# Patient Record
Sex: FEMALE | Race: WHITE | NOT HISPANIC OR LATINO
[De-identification: names, ages, dates, MRNs, and addresses within clinical notes are randomized per-mention and may not be internally consistent; named-entity substitution may affect disease eponyms.]

---

## 2018-01-04 ENCOUNTER — APPOINTMENT (OUTPATIENT)
Dept: FAMILY MEDICINE | Facility: CLINIC | Age: 38
End: 2018-01-04

## 2018-01-04 PROBLEM — Z00.00 ENCOUNTER FOR PREVENTIVE HEALTH EXAMINATION: Status: ACTIVE | Noted: 2018-01-04

## 2018-02-28 ENCOUNTER — APPOINTMENT (OUTPATIENT)
Dept: FAMILY MEDICINE | Facility: CLINIC | Age: 38
End: 2018-02-28
Payer: MEDICAID

## 2018-02-28 VITALS
DIASTOLIC BLOOD PRESSURE: 68 MMHG | HEIGHT: 63.5 IN | BODY MASS INDEX: 19.42 KG/M2 | WEIGHT: 111 LBS | RESPIRATION RATE: 14 BRPM | OXYGEN SATURATION: 99 % | SYSTOLIC BLOOD PRESSURE: 108 MMHG | HEART RATE: 84 BPM | TEMPERATURE: 99.5 F

## 2018-02-28 DIAGNOSIS — Z86.010 PERSONAL HISTORY OF COLONIC POLYPS: ICD-10-CM

## 2018-02-28 DIAGNOSIS — Z80.42 FAMILY HISTORY OF MALIGNANT NEOPLASM OF PROSTATE: ICD-10-CM

## 2018-02-28 DIAGNOSIS — Z82.49 FAMILY HISTORY OF ISCHEMIC HEART DISEASE AND OTHER DISEASES OF THE CIRCULATORY SYSTEM: ICD-10-CM

## 2018-02-28 PROCEDURE — 99213 OFFICE O/P EST LOW 20 MIN: CPT

## 2018-02-28 RX ORDER — VENLAFAXINE HYDROCHLORIDE 75 MG/1
75 CAPSULE, EXTENDED RELEASE ORAL
Qty: 30 | Refills: 0 | Status: ACTIVE | COMMUNITY
Start: 2017-11-21

## 2018-05-23 ENCOUNTER — APPOINTMENT (OUTPATIENT)
Dept: FAMILY MEDICINE | Facility: CLINIC | Age: 38
End: 2018-05-23
Payer: MEDICAID

## 2018-05-23 VITALS
HEART RATE: 80 BPM | OXYGEN SATURATION: 100 % | RESPIRATION RATE: 14 BRPM | HEIGHT: 63.5 IN | DIASTOLIC BLOOD PRESSURE: 68 MMHG | WEIGHT: 112 LBS | SYSTOLIC BLOOD PRESSURE: 92 MMHG | BODY MASS INDEX: 19.6 KG/M2

## 2018-05-23 DIAGNOSIS — B27.90 INFECTIOUS MONONUCLEOSIS, UNSPECIFIED W/OUT COMPLICATION: ICD-10-CM

## 2018-05-23 DIAGNOSIS — Z86.39 PERSONAL HISTORY OF OTHER ENDOCRINE, NUTRITIONAL AND METABOLIC DISEASE: ICD-10-CM

## 2018-05-23 LAB — CYTOLOGY CVX/VAG DOC THIN PREP: NORMAL

## 2018-05-23 PROCEDURE — 99214 OFFICE O/P EST MOD 30 MIN: CPT

## 2018-05-23 RX ORDER — AZITHROMYCIN 250 MG/1
250 TABLET, FILM COATED ORAL
Qty: 6 | Refills: 0 | Status: DISCONTINUED | COMMUNITY
Start: 2018-02-28 | End: 2018-05-23

## 2018-05-23 RX ORDER — ZALEPLON 5 MG/1
5 CAPSULE ORAL
Qty: 30 | Refills: 1 | Status: DISCONTINUED | COMMUNITY
Start: 2018-02-28 | End: 2018-05-23

## 2018-05-23 NOTE — PHYSICAL EXAM
[No Acute Distress] : no acute distress [Well Nourished] : well nourished [Well Developed] : well developed [Well-Appearing] : well-appearing [Normal Voice/Communication] : normal voice/communication [Normal Sclera/Conjunctiva] : normal sclera/conjunctiva [No Respiratory Distress] : no respiratory distress  [Clear to Auscultation] : lungs were clear to auscultation bilaterally [No Accessory Muscle Use] : no accessory muscle use [Normal Rate] : normal rate  [Regular Rhythm] : with a regular rhythm [Normal S1, S2] : normal S1 and S2 [No Murmur] : no murmur heard [Soft] : abdomen soft [Non Tender] : non-tender [Non-distended] : non-distended [No Masses] : no abdominal mass palpated [No HSM] : no HSM [Normal Bowel Sounds] : normal bowel sounds [No Hernias] : no hernias [Speech Grossly Normal] : speech grossly normal [Memory Grossly Normal] : memory grossly normal [Normal Affect] : the affect was normal [Normal Mood] : the mood was normal [Normal Insight/Judgement] : insight and judgment were intact

## 2018-05-23 NOTE — HISTORY OF PRESENT ILLNESS
[FreeTextEntry8] : pt presents for feeling tired, being emotional, has tested positive for ebv so would like to have blood work done. She has been feeling fatigued for several years.  She has trouble sleeping and gets her best sleep in the morning hours.  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  She tried zaleplon for sleep.  She had no improvement in her sleep but it made her tired in the morning.  She takes the clonazepam daily, 1/2 tab in the morning and a full tablet at night.  She gets a sore throat at times.  On 5/17 she had sudden sharp pain in her left upper abdomen.  She was seen at [facility] and was started on protonix but stopped it.  She gets a lot of sinus headaches.  She would like to pursue seeing a sleep specialist.  She just saw an endocrinologist and will have labs done.

## 2018-05-23 NOTE — REVIEW OF SYSTEMS
[Fatigue] : fatigue [Nasal Discharge] : nasal discharge [Abdominal Pain] : abdominal pain [Muscle Weakness] : muscle weakness [Headache] : headache [Insomnia] : insomnia [Anxiety] : anxiety [Depression] : depression [Negative] : Heme/Lymph

## 2018-05-23 NOTE — ASSESSMENT
[FreeTextEntry1] : labs and abdominal ultrasound were ordered, she will schedule a consultation with a sleep medicine specialist, she was advised to try an OTC sedating antihistamine at HS to help with sleep and sinus congestion, I will call her with results when available, emotional support was given

## 2018-05-30 ENCOUNTER — APPOINTMENT (OUTPATIENT)
Dept: FAMILY MEDICINE | Facility: CLINIC | Age: 38
End: 2018-05-30

## 2018-06-08 ENCOUNTER — APPOINTMENT (OUTPATIENT)
Dept: CT IMAGING | Facility: CLINIC | Age: 38
End: 2018-06-08

## 2018-06-08 ENCOUNTER — APPOINTMENT (OUTPATIENT)
Dept: ULTRASOUND IMAGING | Facility: CLINIC | Age: 38
End: 2018-06-08

## 2018-06-12 ENCOUNTER — RESULT REVIEW (OUTPATIENT)
Age: 38
End: 2018-06-12

## 2018-10-04 DIAGNOSIS — H91.90 UNSPECIFIED HEARING LOSS, UNSPECIFIED EAR: ICD-10-CM

## 2019-02-08 ENCOUNTER — APPOINTMENT (OUTPATIENT)
Dept: FAMILY MEDICINE | Facility: CLINIC | Age: 39
End: 2019-02-08
Payer: MEDICAID

## 2019-02-08 VITALS
RESPIRATION RATE: 14 BRPM | TEMPERATURE: 98.2 F | WEIGHT: 115 LBS | SYSTOLIC BLOOD PRESSURE: 98 MMHG | HEART RATE: 95 BPM | DIASTOLIC BLOOD PRESSURE: 60 MMHG | HEIGHT: 63.5 IN | BODY MASS INDEX: 20.12 KG/M2 | OXYGEN SATURATION: 96 %

## 2019-02-08 DIAGNOSIS — J06.9 ACUTE UPPER RESPIRATORY INFECTION, UNSPECIFIED: ICD-10-CM

## 2019-02-08 PROCEDURE — 99214 OFFICE O/P EST MOD 30 MIN: CPT

## 2019-02-08 RX ORDER — NAPROXEN 500 MG/1
500 TABLET ORAL
Qty: 60 | Refills: 0 | Status: DISCONTINUED | COMMUNITY
Start: 2017-09-01 | End: 2019-02-08

## 2019-02-08 RX ORDER — PANTOPRAZOLE 40 MG/1
40 TABLET, DELAYED RELEASE ORAL
Qty: 30 | Refills: 0 | Status: DISCONTINUED | COMMUNITY
Start: 2018-05-17 | End: 2019-02-08

## 2019-02-08 NOTE — HISTORY OF PRESENT ILLNESS
[FreeTextEntry8] : Pt c/o +sore throat +fever +earache X 1 week.  Her symptoms started on monday.  She went to urgent care and was diagnosed with an upper respiratory infection.  She refused a strep test.  She had a sore throat initially and then her ears started hurting and popping.  She has ongoing discharge only from the right nostril and had discomfort in her sinuses.  She feels weak and feverish.  She is sneezing and has postnasal drip and a dry cough.  She took a multisymptoms medication a few days ago.  She has been losing a lot of hair since september.

## 2019-02-08 NOTE — ASSESSMENT
[FreeTextEntry1] : she was advised to take augmentin and use fluticasone nasal spray as directed, labs were ordered to further evaluate her symptoms and assess her health, she was given emotional support and will follow up if symptoms persist or worsen and will receive a phone call with lab results, ENT consultation was advised due to the persistant nasal discharge

## 2019-02-08 NOTE — REVIEW OF SYSTEMS
[Chills] : chills [Fatigue] : fatigue [Earache] : earache [Nasal Discharge] : nasal discharge [Sore Throat] : sore throat [Postnasal Drip] : postnasal drip [Cough] : cough [Muscle Weakness] : muscle weakness [Hair Changes] : hair changes [Headache] : headache [Dizziness] : dizziness [Insomnia] : insomnia [Negative] : Heme/Lymph [Fever] : no fever [Discharge] : no discharge [Shortness Of Breath] : no shortness of breath [Wheezing] : no wheezing

## 2019-02-08 NOTE — PHYSICAL EXAM
[No Acute Distress] : no acute distress [Well Nourished] : well nourished [Well Developed] : well developed [Well-Appearing] : well-appearing [Normal Voice/Communication] : normal voice/communication [Normal Sclera/Conjunctiva] : normal sclera/conjunctiva [Normal Oropharynx] : the oropharynx was normal [Supple] : supple [No Lymphadenopathy] : no lymphadenopathy [No Respiratory Distress] : no respiratory distress  [Clear to Auscultation] : lungs were clear to auscultation bilaterally [No Accessory Muscle Use] : no accessory muscle use [Normal Rate] : normal rate  [Regular Rhythm] : with a regular rhythm [Normal S1, S2] : normal S1 and S2 [Speech Grossly Normal] : speech grossly normal [Memory Grossly Normal] : memory grossly normal [Normal Insight/Judgement] : insight and judgment were intact [de-identified] : TM's dull bilaterally, erythema and edema in bilateral nasal passages [de-identified] : she appears anxious

## 2019-02-25 ENCOUNTER — APPOINTMENT (OUTPATIENT)
Dept: FAMILY MEDICINE | Facility: CLINIC | Age: 39
End: 2019-02-25

## 2019-02-26 ENCOUNTER — APPOINTMENT (OUTPATIENT)
Dept: FAMILY MEDICINE | Facility: CLINIC | Age: 39
End: 2019-02-26

## 2019-02-27 ENCOUNTER — APPOINTMENT (OUTPATIENT)
Dept: FAMILY MEDICINE | Facility: CLINIC | Age: 39
End: 2019-02-27
Payer: MEDICAID

## 2019-02-27 VITALS
WEIGHT: 115 LBS | TEMPERATURE: 98.8 F | DIASTOLIC BLOOD PRESSURE: 80 MMHG | OXYGEN SATURATION: 98 % | BODY MASS INDEX: 20.12 KG/M2 | HEART RATE: 95 BPM | RESPIRATION RATE: 14 BRPM | HEIGHT: 63.5 IN | SYSTOLIC BLOOD PRESSURE: 104 MMHG

## 2019-02-27 PROCEDURE — G0444 DEPRESSION SCREEN ANNUAL: CPT

## 2019-02-27 PROCEDURE — 99214 OFFICE O/P EST MOD 30 MIN: CPT | Mod: 25

## 2019-02-27 NOTE — PHYSICAL EXAM
[No Acute Distress] : no acute distress [Well Nourished] : well nourished [Well Developed] : well developed [Well-Appearing] : well-appearing [Normal Sclera/Conjunctiva] : normal sclera/conjunctiva [Normal Outer Ear/Nose] : the outer ears and nose were normal in appearance [Normal TMs] : both tympanic membranes were normal [Supple] : supple [No Lymphadenopathy] : no lymphadenopathy [No Respiratory Distress] : no respiratory distress  [Clear to Auscultation] : lungs were clear to auscultation bilaterally [No Accessory Muscle Use] : no accessory muscle use [Normal Rate] : normal rate  [Regular Rhythm] : with a regular rhythm [Normal S1, S2] : normal S1 and S2 [No Murmur] : no murmur heard [Normal Gait] : normal gait [Coordination Grossly Intact] : coordination grossly intact [No Focal Deficits] : no focal deficits [Speech Grossly Normal] : speech grossly normal [Memory Grossly Normal] : memory grossly normal [Alert and Oriented x3] : oriented to person, place, and time [Normal Insight/Judgement] : insight and judgment were intact [de-identified] : bilateral nasal turbinates and posterior oropharynx erythematous, moderate clear post nasal drip [de-identified] : anxious

## 2019-02-27 NOTE — ASSESSMENT
[FreeTextEntry1] : Rest, fluids, flonase, vitamin C \par     - pt advised to take antibiotics as prescribed and to finish complete course\par     - patient instructed to RTO if symptoms worsen or persist, if fevers develop, does not get better in 7 days.\par \par Anxiety/Depression:\par    - PHQ 9 - 18\par    - referral given to pt for a therapist\par    - advised her to follow up with psychiatry\par    - yoga, exercise, mindfulness, meditation, deep breathing encouraged

## 2019-02-27 NOTE — HISTORY OF PRESENT ILLNESS
[FreeTextEntry8] : Pt c/o +losing voice +phlegmy feeling +sore throat +ear ache X 2-3 weeks. Did not take antibiotics as prescribed for her sinusitis. \par Used tylenol pm cold and sinus with some relief. Denies fevers, shortness of breath, wheezing, n/v/d. Patient also reports significant financial, work and relationship stressors. Sees psychiatrist. Reports difficulty sleeping and getting out of bed on time for work. She is stressed about aging and her hair thinning. She is requesting a referral to speak with a therapist. Pt denies being suicidal or having any plans.

## 2019-02-27 NOTE — REVIEW OF SYSTEMS
[Nasal Discharge] : nasal discharge [Sore Throat] : sore throat [Postnasal Drip] : postnasal drip [Cough] : cough [Anxiety] : anxiety [Depression] : depression [Negative] : Heme/Lymph

## 2019-03-08 ENCOUNTER — RX RENEWAL (OUTPATIENT)
Age: 39
End: 2019-03-08

## 2019-03-11 ENCOUNTER — RX RENEWAL (OUTPATIENT)
Age: 39
End: 2019-03-11

## 2020-03-20 ENCOUNTER — TRANSCRIPTION ENCOUNTER (OUTPATIENT)
Age: 40
End: 2020-03-20

## 2020-12-03 ENCOUNTER — TRANSCRIPTION ENCOUNTER (OUTPATIENT)
Age: 40
End: 2020-12-03

## 2020-12-09 ENCOUNTER — TRANSCRIPTION ENCOUNTER (OUTPATIENT)
Age: 40
End: 2020-12-09

## 2021-06-02 ENCOUNTER — APPOINTMENT (OUTPATIENT)
Dept: FAMILY MEDICINE | Facility: CLINIC | Age: 41
End: 2021-06-02
Payer: MEDICAID

## 2021-06-02 VITALS
SYSTOLIC BLOOD PRESSURE: 118 MMHG | TEMPERATURE: 97.7 F | RESPIRATION RATE: 14 BRPM | BODY MASS INDEX: 21 KG/M2 | DIASTOLIC BLOOD PRESSURE: 72 MMHG | OXYGEN SATURATION: 99 % | WEIGHT: 120 LBS | HEART RATE: 88 BPM | HEIGHT: 63.5 IN

## 2021-06-02 DIAGNOSIS — U07.1 COVID-19: ICD-10-CM

## 2021-06-02 DIAGNOSIS — E78.2 MIXED HYPERLIPIDEMIA: ICD-10-CM

## 2021-06-02 DIAGNOSIS — Z87.898 PERSONAL HISTORY OF OTHER SPECIFIED CONDITIONS: ICD-10-CM

## 2021-06-02 DIAGNOSIS — J01.00 ACUTE MAXILLARY SINUSITIS, UNSPECIFIED: ICD-10-CM

## 2021-06-02 DIAGNOSIS — R35.0 FREQUENCY OF MICTURITION: ICD-10-CM

## 2021-06-02 LAB — CYTOLOGY CVX/VAG DOC THIN PREP: NORMAL

## 2021-06-02 PROCEDURE — 99072 ADDL SUPL MATRL&STAF TM PHE: CPT

## 2021-06-02 PROCEDURE — 36415 COLL VENOUS BLD VENIPUNCTURE: CPT

## 2021-06-02 PROCEDURE — 99214 OFFICE O/P EST MOD 30 MIN: CPT | Mod: 25

## 2021-06-02 RX ORDER — TRAZODONE HYDROCHLORIDE 50 MG/1
50 TABLET ORAL
Qty: 30 | Refills: 0 | Status: DISCONTINUED | COMMUNITY
Start: 2021-01-25

## 2021-06-02 RX ORDER — SULFAMETHOXAZOLE AND TRIMETHOPRIM 800; 160 MG/1; MG/1
800-160 TABLET ORAL
Qty: 10 | Refills: 0 | Status: DISCONTINUED | COMMUNITY
Start: 2021-01-05

## 2021-06-02 RX ORDER — VENLAFAXINE HYDROCHLORIDE 37.5 MG/1
37.5 CAPSULE, EXTENDED RELEASE ORAL
Qty: 15 | Refills: 0 | Status: DISCONTINUED | COMMUNITY
Start: 2021-02-09

## 2021-06-02 RX ORDER — GABAPENTIN 100 MG/1
100 CAPSULE ORAL
Qty: 15 | Refills: 0 | Status: DISCONTINUED | COMMUNITY
Start: 2021-02-23

## 2021-06-02 RX ORDER — CLONAZEPAM 2 MG/1
2 TABLET ORAL
Qty: 60 | Refills: 0 | Status: DISCONTINUED | COMMUNITY
Start: 2018-01-25 | End: 2021-06-02

## 2021-06-02 RX ORDER — VENLAFAXINE HYDROCHLORIDE 150 MG/1
150 CAPSULE, EXTENDED RELEASE ORAL
Qty: 22 | Refills: 0 | Status: DISCONTINUED | COMMUNITY
Start: 2021-01-05

## 2021-06-02 RX ORDER — BUSPIRONE HYDROCHLORIDE 15 MG/1
15 TABLET ORAL
Qty: 28 | Refills: 0 | Status: DISCONTINUED | COMMUNITY
Start: 2021-01-25

## 2021-06-02 RX ORDER — CHROMIUM 200 MCG
25 MCG TABLET ORAL
Qty: 30 | Refills: 0 | Status: DISCONTINUED | COMMUNITY
Start: 2021-01-21

## 2021-06-02 RX ORDER — AZELASTINE HYDROCHLORIDE 137 UG/1
0.1 SPRAY, METERED NASAL
Qty: 30 | Refills: 0 | Status: DISCONTINUED | COMMUNITY
Start: 2021-04-09

## 2021-06-02 RX ORDER — AMOXICILLIN AND CLAVULANATE POTASSIUM 875; 125 MG/1; MG/1
875-125 TABLET, COATED ORAL
Qty: 20 | Refills: 0 | Status: DISCONTINUED | COMMUNITY
Start: 2019-02-08 | End: 2021-06-02

## 2021-06-02 RX ORDER — LORATADINE 10 MG/1
10 TABLET ORAL
Qty: 30 | Refills: 0 | Status: DISCONTINUED | COMMUNITY
Start: 2021-04-07

## 2021-06-02 NOTE — REVIEW OF SYSTEMS
[Fatigue] : fatigue [Nocturia] : nocturia [Frequency] : frequency [Anxiety] : anxiety [Depression] : depression [Negative] : Heme/Lymph [de-identified] : numbness

## 2021-06-02 NOTE — HISTORY OF PRESENT ILLNESS
[FreeTextEntry1] : pt presents for follow up and blood work to be done [de-identified] : She has been under increased stress as her mother is ill with recurrent multiple myeloma.  She is seeing a psychiatrist now.  She had lab testing a few years ago and was told she had high cholesterol and she is concerned.  For the past few weeks she is waking up more often during the night to urinate.  She has a large fibroid in her uterus.  Also sometimes when lying in bed the left side of her body will go numb.

## 2021-06-02 NOTE — PHYSICAL EXAM
[No Acute Distress] : no acute distress [Well Nourished] : well nourished [Well Developed] : well developed [Well-Appearing] : well-appearing [Normal Voice/Communication] : normal voice/communication [Supple] : supple [No Respiratory Distress] : no respiratory distress  [No Accessory Muscle Use] : no accessory muscle use [Clear to Auscultation] : lungs were clear to auscultation bilaterally [Normal Rate] : normal rate  [Regular Rhythm] : with a regular rhythm [Normal S1, S2] : normal S1 and S2 [No Murmur] : no murmur heard [No Carotid Bruits] : no carotid bruits [Soft] : abdomen soft [Non Tender] : non-tender [Non-distended] : non-distended [Coordination Grossly Intact] : coordination grossly intact [Normal Mood] : the mood was normal

## 2021-06-02 NOTE — PLAN
[FreeTextEntry1] : she was given emotional support, labs will be drawn in the office today including CBC, CMP, ferritin, B12, lipid panel, a1C and TSH to assess her symptoms, neurology consultation advised, she will be called with results

## 2021-06-03 LAB
ALBUMIN SERPL ELPH-MCNC: 4.5 G/DL
ALP BLD-CCNC: 57 U/L
ALT SERPL-CCNC: 18 U/L
ANION GAP SERPL CALC-SCNC: 10 MMOL/L
AST SERPL-CCNC: 17 U/L
BASOPHILS # BLD AUTO: 0.05 K/UL
BASOPHILS NFR BLD AUTO: 1.3 %
BILIRUB SERPL-MCNC: 0.3 MG/DL
BUN SERPL-MCNC: 13 MG/DL
CALCIUM SERPL-MCNC: 9.7 MG/DL
CHLORIDE SERPL-SCNC: 102 MMOL/L
CHOLEST SERPL-MCNC: 225 MG/DL
CO2 SERPL-SCNC: 28 MMOL/L
CREAT SERPL-MCNC: 0.8 MG/DL
EOSINOPHIL # BLD AUTO: 0.05 K/UL
EOSINOPHIL NFR BLD AUTO: 1.3 %
ESTIMATED AVERAGE GLUCOSE: 105 MG/DL
FERRITIN SERPL-MCNC: 107 NG/ML
FOLATE SERPL-MCNC: >20 NG/ML
GLUCOSE SERPL-MCNC: 69 MG/DL
HBA1C MFR BLD HPLC: 5.3 %
HCT VFR BLD CALC: 41.5 %
HDLC SERPL-MCNC: 90 MG/DL
HGB BLD-MCNC: 13.4 G/DL
IMM GRANULOCYTES NFR BLD AUTO: 0.3 %
LDLC SERPL CALC-MCNC: 125 MG/DL
LYMPHOCYTES # BLD AUTO: 1.13 K/UL
LYMPHOCYTES NFR BLD AUTO: 28.3 %
MAN DIFF?: NORMAL
MCHC RBC-ENTMCNC: 30.9 PG
MCHC RBC-ENTMCNC: 32.3 GM/DL
MCV RBC AUTO: 95.8 FL
MONOCYTES # BLD AUTO: 0.39 K/UL
MONOCYTES NFR BLD AUTO: 9.8 %
NEUTROPHILS # BLD AUTO: 2.36 K/UL
NEUTROPHILS NFR BLD AUTO: 59 %
NONHDLC SERPL-MCNC: 135 MG/DL
PLATELET # BLD AUTO: 262 K/UL
POTASSIUM SERPL-SCNC: 4.1 MMOL/L
PROT SERPL-MCNC: 7.1 G/DL
RBC # BLD: 4.33 M/UL
RBC # FLD: 14.2 %
SODIUM SERPL-SCNC: 139 MMOL/L
TRIGL SERPL-MCNC: 52 MG/DL
TSH SERPL-ACNC: 1.05 UIU/ML
VIT B12 SERPL-MCNC: 506 PG/ML
WBC # FLD AUTO: 3.99 K/UL

## 2021-06-14 ENCOUNTER — APPOINTMENT (OUTPATIENT)
Dept: OBGYN | Facility: CLINIC | Age: 41
End: 2021-06-14
Payer: MEDICAID

## 2021-06-14 ENCOUNTER — NON-APPOINTMENT (OUTPATIENT)
Age: 41
End: 2021-06-14

## 2021-06-14 VITALS
BODY MASS INDEX: 21 KG/M2 | SYSTOLIC BLOOD PRESSURE: 102 MMHG | WEIGHT: 120 LBS | HEIGHT: 63.5 IN | RESPIRATION RATE: 16 BRPM | TEMPERATURE: 97.3 F | DIASTOLIC BLOOD PRESSURE: 60 MMHG

## 2021-06-14 DIAGNOSIS — Z80.7 FAMILY HISTORY OF OTHER MALIGNANT NEOPLASMS OF LYMPHOID, HEMATOPOIETIC AND RELATED TISSUES: ICD-10-CM

## 2021-06-14 PROCEDURE — 99215 OFFICE O/P EST HI 40 MIN: CPT

## 2021-06-14 PROCEDURE — 99072 ADDL SUPL MATRL&STAF TM PHE: CPT

## 2021-06-14 NOTE — HISTORY OF PRESENT ILLNESS
[Patient reported PAP Smear was normal] : Patient reported PAP Smear was normal [N] : Patient does not use contraception [Monogamous (Male Partner)] : is monogamous with a male partner [Y] : Positive pregnancy history [PapSmeardate] : 2020 [LMPDate] : 6/4/21 [PGxTotal] : 1 [PGHxABInduced] : 1 [FreeTextEntry1] : ETOPx1. Denies cysts, +h/o fibroids, Hx LGSIL 2014 resolved, hx condyloma

## 2021-06-14 NOTE — PHYSICAL EXAM
[Appropriately responsive] : appropriately responsive [Alert] : alert [No Acute Distress] : no acute distress [No Lymphadenopathy] : no lymphadenopathy [Soft] : soft [Non-tender] : non-tender [Non-distended] : non-distended [No HSM] : No HSM [No Lesions] : no lesions [No Mass] : no mass [Oriented x3] : oriented x3 [Labia Majora] : normal [Labia Minora] : normal [Normal] : normal [Enlarged ___ wks] : enlarged [unfilled] ~Uweeks [Uterine Adnexae] : normal [FreeTextEntry6] : 16 wk mobile globular uterus with palpable posterior mass pressing anteriorly, no adnexal masses/tenderness

## 2021-06-14 NOTE — DISCUSSION/SUMMARY
[FreeTextEntry1] : 39 yo with large fibroid uterus and episodes of urinary retention likely secondary to compression by fibroid. \par -Given her desire for fertility, she is not a candidate for uterine artery embolization. Discussed recommendation for myomectomy as this is likely the cause of her urinary retention and symptoms. Based on her exam, she is a candidate for minimally invasive approach. We discussed risks and benefits of robotic myomectomy vs open myomectomy. She prefers open myomectomy for aesthetic purposes of one incision below the bikini line. We discussed plan for using the smallest incision that will allow us to do the surgery safely. Discussed expected recovery time, pain, restrictions postoperatively. Discussed risk of hysterectomy as life-saving procedure if uncontrolled bleeding.\par -We discussed that her urinary symptoms are likely secondary to obstruction from fibroid. However, if her symptoms persist after surgery, she should be evaluated by a urologist\par -She would like to proceed with scheduling a surgical date. She will be contacted. \par -Return for preop visit\par -Instructions given to return to ED if unable to void\par \par Approximately 45 minutes were spent in face-to-face counseling regarding fibroids, symptoms, treatment options, and questions regarding surgery

## 2021-06-21 ENCOUNTER — APPOINTMENT (OUTPATIENT)
Dept: OBGYN | Facility: CLINIC | Age: 41
End: 2021-06-21
Payer: MEDICAID

## 2021-06-21 VITALS
TEMPERATURE: 97 F | BODY MASS INDEX: 20.82 KG/M2 | DIASTOLIC BLOOD PRESSURE: 68 MMHG | HEIGHT: 63.5 IN | WEIGHT: 119 LBS | RESPIRATION RATE: 16 BRPM | SYSTOLIC BLOOD PRESSURE: 110 MMHG

## 2021-06-21 DIAGNOSIS — D21.9 BENIGN NEOPLASM OF CONNECTIVE AND OTHER SOFT TISSUE, UNSPECIFIED: ICD-10-CM

## 2021-06-21 DIAGNOSIS — R33.9 RETENTION OF URINE, UNSPECIFIED: ICD-10-CM

## 2021-06-21 LAB
BILIRUB UR QL STRIP: NORMAL
CLARITY UR: CLEAR
COLLECTION METHOD: NORMAL
GLUCOSE UR-MCNC: NORMAL
HCG UR QL: 0.2 EU/DL
HGB UR QL STRIP.AUTO: NORMAL
KETONES UR-MCNC: NORMAL
LEUKOCYTE ESTERASE UR QL STRIP: NORMAL
NITRITE UR QL STRIP: NORMAL
PH UR STRIP: 5.5
PROT UR STRIP-MCNC: NORMAL
SP GR UR STRIP: 1.02

## 2021-06-21 PROCEDURE — 99072 ADDL SUPL MATRL&STAF TM PHE: CPT

## 2021-06-21 PROCEDURE — 99215 OFFICE O/P EST HI 40 MIN: CPT | Mod: 25

## 2021-06-21 PROCEDURE — 81003 URINALYSIS AUTO W/O SCOPE: CPT | Mod: QW

## 2021-06-21 NOTE — HISTORY OF PRESENT ILLNESS
[FreeTextEntry1] : Pt here for preop visit. Pt completed Bactrim, feeling better. States intermittent pelvic pressure and feeling of urinary retention, most recent episode lasted 15 minutes then was able to void. States she had to push on her lower abdomen to void sometimes and then defecates at the same time. Denies urinary or fecal incontinence or splinting. \par \par Risks, benefits, alternatives of abdominal myomectomy discussed. Risks of surgery discussed including bleeding, possible transfusion, infection, damage to nearby organs, vessels, nerves requiring repair, possible permanent or temporary injury, possible venous thromboembolism, risks of anesthesia and perioperative death. Discussed using TXA intraoperatively and misoprostal 400mcg preoperatively to minimize blood loss during surgery. Pt desires minilaparotomy rather than robotic approach. DIscussed we will try to make the incision as small as possible that still allows removal of fibroids. If additional fibroids, we will remove any fibroids that can be safely removed. Discussed risk of possible  section in the future depending on the size of the uterine incision and extent of the surgery. Pt had many questions, all of which were answered to the best of my satisfaction. She has constipation and urinary symptoms that are likely secondary to the mass effect of this large fibroid. We discussed that if her symptoms persist after her recovery, she may require further evaluation. \par \par Pt requesting exam today to confirm no prolapse. States she had two episodes during defecation and valsalva where she felt something prolapse out of the rectum and pushed it back in. States it did not protrude into the vagina. \par \par PST labs reviewed and normal. \par \par Urine dipstick negative today.

## 2021-06-21 NOTE — DISCUSSION/SUMMARY
[FreeTextEntry1] : 41 yo with symptomatic fibroid uterus.\par -For abdominal myomectomy tomorrow\par -Postoperative expectations and restrictions reviewed\par -Continue to monitor urinary and GI symptoms postoperatively

## 2021-06-22 ENCOUNTER — APPOINTMENT (OUTPATIENT)
Dept: OBGYN | Facility: HOSPITAL | Age: 41
End: 2021-06-22

## 2021-06-22 ENCOUNTER — RESULT REVIEW (OUTPATIENT)
Age: 41
End: 2021-06-22

## 2021-06-28 ENCOUNTER — APPOINTMENT (OUTPATIENT)
Dept: OBGYN | Facility: CLINIC | Age: 41
End: 2021-06-28
Payer: MEDICAID

## 2021-06-28 VITALS
TEMPERATURE: 97.3 F | SYSTOLIC BLOOD PRESSURE: 100 MMHG | RESPIRATION RATE: 16 BRPM | DIASTOLIC BLOOD PRESSURE: 66 MMHG | BODY MASS INDEX: 19.59 KG/M2 | HEIGHT: 65.5 IN | WEIGHT: 119 LBS

## 2021-06-28 VITALS — HEART RATE: 92 BPM | OXYGEN SATURATION: 99 %

## 2021-06-28 PROCEDURE — 99024 POSTOP FOLLOW-UP VISIT: CPT

## 2021-06-29 NOTE — DISCUSSION/SUMMARY
[FreeTextEntry1] : 41 yo POD6 s/p abdominal myomectomy doing well postoperatively. \par -Intraoperative and pathology findings reviewed with pt, all questions answered\par -Continue Motrin/Tylenol prn with Oxycodone for breakthrough pain\par -Colace BID, Miralax prn\par -Postoperative restrictions reviewed\par -Return in 1 month for follow-up

## 2021-06-29 NOTE — HISTORY OF PRESENT ILLNESS
[FreeTextEntry1] : 39 yo POD6 s/p abdominal myomectomy here for follow-up visit. Postoperatively experienced nausea, tachycardia, feeling jittery, likely secondary to missed doses of Effexor and Gabapentin. Symptoms improved after restarting. Pt states feeling better daily, occasional nausea improving. Pain well-controlled. Tolerating PO intake. Feels like she is emptying her bladder. Having BM. Had light vaginal bleeding x 3 days\par \par Pathology: fibroids, showing patchy hyalinization

## 2021-06-29 NOTE — PHYSICAL EXAM
[Appropriately responsive] : appropriately responsive [Alert] : alert [No Acute Distress] : no acute distress [Soft] : soft [No Lymphadenopathy] : no lymphadenopathy [Non-distended] : non-distended [No HSM] : No HSM [No Lesions] : no lesions [No Mass] : no mass [Oriented x3] : oriented x3 [FreeTextEntry7] : appropriately tender near incision, incision c/d/i, steri strips removed, no erythema/drainage

## 2021-07-28 ENCOUNTER — APPOINTMENT (OUTPATIENT)
Dept: OBGYN | Facility: CLINIC | Age: 41
End: 2021-07-28
Payer: MEDICAID

## 2021-07-28 VITALS
WEIGHT: 115 LBS | RESPIRATION RATE: 16 BRPM | TEMPERATURE: 96.5 F | BODY MASS INDEX: 20.12 KG/M2 | DIASTOLIC BLOOD PRESSURE: 70 MMHG | HEIGHT: 63.5 IN | SYSTOLIC BLOOD PRESSURE: 110 MMHG

## 2021-07-28 PROCEDURE — 99024 POSTOP FOLLOW-UP VISIT: CPT

## 2021-07-28 NOTE — PHYSICAL EXAM
[Appropriately responsive] : appropriately responsive [Alert] : alert [No Acute Distress] : no acute distress [No Lymphadenopathy] : no lymphadenopathy [Soft] : soft [Non-distended] : non-distended [No HSM] : No HSM [No Lesions] : no lesions [No Mass] : no mass [Oriented x3] : oriented x3 [FreeTextEntry7] : incision well-healed, no erythema/drainage [Labia Majora] : normal [Labia Minora] : normal [Normal] : normal [Tenderness] : nontender [Uterine Adnexae] : normal [External Hemorrhoid] : external hemorrhoid [FreeTextEntry6] : 10 week anteverted uterus

## 2021-09-22 ENCOUNTER — TRANSCRIPTION ENCOUNTER (OUTPATIENT)
Age: 41
End: 2021-09-22

## 2021-09-24 ENCOUNTER — APPOINTMENT (OUTPATIENT)
Dept: FAMILY MEDICINE | Facility: CLINIC | Age: 41
End: 2021-09-24
Payer: MEDICAID

## 2021-09-24 VITALS
HEIGHT: 63 IN | WEIGHT: 110 LBS | BODY MASS INDEX: 19.49 KG/M2 | HEART RATE: 90 BPM | TEMPERATURE: 98.4 F | OXYGEN SATURATION: 99 % | DIASTOLIC BLOOD PRESSURE: 90 MMHG | SYSTOLIC BLOOD PRESSURE: 100 MMHG | RESPIRATION RATE: 15 BRPM

## 2021-09-24 VITALS — SYSTOLIC BLOOD PRESSURE: 116 MMHG | DIASTOLIC BLOOD PRESSURE: 76 MMHG

## 2021-09-24 DIAGNOSIS — Z13.0 ENCOUNTER FOR SCREENING FOR DISEASES OF THE BLOOD AND BLOOD-FORMING ORGANS AND CERTAIN DISORDERS INVOLVING THE IMMUNE MECHANISM: ICD-10-CM

## 2021-09-24 DIAGNOSIS — J01.90 ACUTE SINUSITIS, UNSPECIFIED: ICD-10-CM

## 2021-09-24 DIAGNOSIS — Z48.89 ENCOUNTER FOR OTHER SPECIFIED SURGICAL AFTERCARE: ICD-10-CM

## 2021-09-24 DIAGNOSIS — Z13.29 ENCOUNTER FOR SCREENING FOR OTHER SUSPECTED ENDOCRINE DISORDER: ICD-10-CM

## 2021-09-24 PROCEDURE — 99213 OFFICE O/P EST LOW 20 MIN: CPT

## 2021-09-24 RX ORDER — FLUTICASONE PROPIONATE 50 UG/1
50 SPRAY, METERED NASAL DAILY
Qty: 3 | Refills: 1 | Status: DISCONTINUED | COMMUNITY
Start: 2019-02-08 | End: 2021-09-24

## 2021-09-24 RX ORDER — BUPROPION HYDROCHLORIDE 75 MG/1
75 TABLET, FILM COATED ORAL
Qty: 30 | Refills: 0 | Status: DISCONTINUED | COMMUNITY
Start: 2021-07-15

## 2021-09-24 RX ORDER — SULFAMETHOXAZOLE AND TRIMETHOPRIM 800; 160 MG/1; MG/1
800-160 TABLET ORAL TWICE DAILY
Qty: 6 | Refills: 0 | Status: DISCONTINUED | COMMUNITY
Start: 2021-06-17 | End: 2021-09-24

## 2021-09-24 RX ORDER — FLUTICASONE PROPIONATE 50 UG/1
50 SPRAY, METERED NASAL DAILY
Qty: 1 | Refills: 3 | Status: ACTIVE | COMMUNITY
Start: 2021-09-24 | End: 1900-01-01

## 2021-09-24 NOTE — PHYSICAL EXAM
[No Acute Distress] : no acute distress [Well Nourished] : well nourished [Well Developed] : well developed [Well-Appearing] : well-appearing [Normal Voice/Communication] : normal voice/communication [Normal Sclera/Conjunctiva] : normal sclera/conjunctiva [Normal Oropharynx] : the oropharynx was normal [No Lymphadenopathy] : no lymphadenopathy [Supple] : supple [No Respiratory Distress] : no respiratory distress  [No Accessory Muscle Use] : no accessory muscle use [Clear to Auscultation] : lungs were clear to auscultation bilaterally [Normal Rate] : normal rate  [Regular Rhythm] : with a regular rhythm [Normal S1, S2] : normal S1 and S2 [Coordination Grossly Intact] : coordination grossly intact [Normal Mood] : the mood was normal [de-identified] : TM's dull bilaterally, edema and erythema in nasal passages, worse on left side

## 2021-09-24 NOTE — REVIEW OF SYSTEMS
[Fatigue] : fatigue [Earache] : earache [Nasal Discharge] : nasal discharge [Headache] : headache [Insomnia] : insomnia [Anxiety] : anxiety [Negative] : Heme/Lymph [Fever] : no fever [Sore Throat] : no sore throat [Postnasal Drip] : no postnasal drip [Shortness Of Breath] : no shortness of breath [Wheezing] : no wheezing [Cough] : no cough

## 2021-09-24 NOTE — PLAN
[FreeTextEntry1] : she was advised to take medications as prescribed and to follow up in 7-10 days if unimproved or sooner if symptoms worsen

## 2021-09-24 NOTE — HISTORY OF PRESENT ILLNESS
[FreeTextEntry8] : Patient presents for headaches, sinus pressure, sensitivity/pressure in ears, nausea symptoms started 4/5 days ago. Pt has not been tested for COVID.  For the past 4-5 days she has pressure in her ears and in her face, the headaches got worse toward the end of the day.  She is under increased stress due to her mother's illness.  She has some nasal discharge from the left nostril.  She hasn't had a cough.  She is taking Tylenol and ibuprofen if needed.  She has had mild nausea and is able to taste and smell normally.

## 2021-12-06 ENCOUNTER — APPOINTMENT (OUTPATIENT)
Dept: FAMILY MEDICINE | Facility: CLINIC | Age: 41
End: 2021-12-06
Payer: MEDICAID

## 2021-12-06 VITALS
WEIGHT: 116 LBS | DIASTOLIC BLOOD PRESSURE: 70 MMHG | HEIGHT: 63 IN | OXYGEN SATURATION: 98 % | SYSTOLIC BLOOD PRESSURE: 120 MMHG | RESPIRATION RATE: 14 BRPM | BODY MASS INDEX: 20.55 KG/M2 | HEART RATE: 90 BPM

## 2021-12-06 DIAGNOSIS — L65.9 NONSCARRING HAIR LOSS, UNSPECIFIED: ICD-10-CM

## 2021-12-06 PROCEDURE — 99214 OFFICE O/P EST MOD 30 MIN: CPT

## 2021-12-06 RX ORDER — IBUPROFEN 600 MG/1
600 TABLET, FILM COATED ORAL
Qty: 20 | Refills: 0 | Status: COMPLETED | COMMUNITY
Start: 2021-06-23

## 2021-12-06 RX ORDER — ONDANSETRON 8 MG/1
8 TABLET ORAL
Qty: 15 | Refills: 0 | Status: COMPLETED | COMMUNITY
Start: 2021-06-23

## 2021-12-06 RX ORDER — ONDANSETRON 4 MG/1
4 TABLET ORAL
Qty: 15 | Refills: 0 | Status: COMPLETED | COMMUNITY
Start: 2021-06-23

## 2021-12-06 NOTE — REVIEW OF SYSTEMS
[Negative] : Heme/Lymph [Anxiety] : anxiety [Depression] : depression [FreeTextEntry2] : sever hair loss

## 2021-12-06 NOTE — HISTORY OF PRESENT ILLNESS
[FreeTextEntry8] : pt c/o hair loss x 2 weeks \par pt c/o fatigue, stress \par \par \par Presenting in office with sudden loss of hair, she denies itching scalp, hair pulling, change in shampoos/conditioners, and feels this has been happening for a few months but had a drastic loss in the past few weeks. She currently takes high dose biotin and is under extreme stress taking care of her mother who has been diagnosed with cancer.

## 2021-12-06 NOTE — PLAN
[FreeTextEntry1] : hair loss\par she has patches of balding on head\par refer to dermatology \par \par anxiety\par add buspar to venlafaxine\par RTO one month\par refer to new psychiatrist

## 2021-12-06 NOTE — PHYSICAL EXAM
[No Acute Distress] : no acute distress [Well Nourished] : well nourished [Well Developed] : well developed [Well-Appearing] : well-appearing [Normal Sclera/Conjunctiva] : normal sclera/conjunctiva [PERRL] : pupils equal round and reactive to light [EOMI] : extraocular movements intact [Normal Outer Ear/Nose] : the outer ears and nose were normal in appearance [Normal Oropharynx] : the oropharynx was normal [No JVD] : no jugular venous distention [No Lymphadenopathy] : no lymphadenopathy [Supple] : supple [Thyroid Normal, No Nodules] : the thyroid was normal and there were no nodules present [No Respiratory Distress] : no respiratory distress  [No Accessory Muscle Use] : no accessory muscle use [Clear to Auscultation] : lungs were clear to auscultation bilaterally [Normal Rate] : normal rate  [Regular Rhythm] : with a regular rhythm [Normal S1, S2] : normal S1 and S2 [No Murmur] : no murmur heard [No Carotid Bruits] : no carotid bruits [No Abdominal Bruit] : a ~M bruit was not heard ~T in the abdomen [No Varicosities] : no varicosities [Pedal Pulses Present] : the pedal pulses are present [No Edema] : there was no peripheral edema [No Palpable Aorta] : no palpable aorta [No Extremity Clubbing/Cyanosis] : no extremity clubbing/cyanosis [Soft] : abdomen soft [Non Tender] : non-tender [Non-distended] : non-distended [No Masses] : no abdominal mass palpated [No HSM] : no HSM [Normal Bowel Sounds] : normal bowel sounds [Normal Posterior Cervical Nodes] : no posterior cervical lymphadenopathy [Normal Anterior Cervical Nodes] : no anterior cervical lymphadenopathy [No CVA Tenderness] : no CVA  tenderness [No Spinal Tenderness] : no spinal tenderness [No Joint Swelling] : no joint swelling [Grossly Normal Strength/Tone] : grossly normal strength/tone [No Rash] : no rash [Coordination Grossly Intact] : coordination grossly intact [No Focal Deficits] : no focal deficits [Normal Gait] : normal gait [Deep Tendon Reflexes (DTR)] : deep tendon reflexes were 2+ and symmetric [Normal Affect] : the affect was normal [Normal Insight/Judgement] : insight and judgment were intact [de-identified] : Severe hair loss  [de-identified] : appears anxious and fidgeting

## 2021-12-09 ENCOUNTER — APPOINTMENT (OUTPATIENT)
Dept: OBGYN | Facility: CLINIC | Age: 41
End: 2021-12-09
Payer: MEDICAID

## 2021-12-09 VITALS
HEIGHT: 63 IN | WEIGHT: 116 LBS | BODY MASS INDEX: 20.55 KG/M2 | DIASTOLIC BLOOD PRESSURE: 63 MMHG | SYSTOLIC BLOOD PRESSURE: 102 MMHG

## 2021-12-09 DIAGNOSIS — Z01.419 ENCOUNTER FOR GYNECOLOGICAL EXAMINATION (GENERAL) (ROUTINE) W/OUT ABNORMAL FINDINGS: ICD-10-CM

## 2021-12-09 DIAGNOSIS — Z11.3 ENCOUNTER FOR SCREENING FOR INFECTIONS WITH A PREDOMINANTLY SEXUAL MODE OF TRANSMISSION: ICD-10-CM

## 2021-12-09 DIAGNOSIS — R61 GENERALIZED HYPERHIDROSIS: ICD-10-CM

## 2021-12-09 LAB
HCG UR QL: NEGATIVE
QUALITY CONTROL: YES

## 2021-12-09 PROCEDURE — 99396 PREV VISIT EST AGE 40-64: CPT

## 2021-12-09 PROCEDURE — 81025 URINE PREGNANCY TEST: CPT

## 2021-12-09 NOTE — PHYSICAL EXAM
[Chaperone Present] : A chaperone was present in the examining room during all aspects of the physical examination [FreeTextEntry1] : DEB Vega  [Appropriately responsive] : appropriately responsive [Alert] : alert [No Acute Distress] : no acute distress [No Lymphadenopathy] : no lymphadenopathy [Soft] : soft [Non-distended] : non-distended [No HSM] : No HSM [No Lesions] : no lesions [No Mass] : no mass [Oriented x3] : oriented x3 [FreeTextEntry7] : incision well-healed [Examination Of The Breasts] : a normal appearance [No Discharge] : no discharge [No Masses] : no breast masses were palpable [Labia Majora] : normal [Labia Minora] : normal [Normal] : normal [Tenderness] : nontender [Enlarged ___ wks] : not enlarged [Uterine Adnexae] : normal [External Hemorrhoid] : external hemorrhoid

## 2021-12-09 NOTE — HISTORY OF PRESENT ILLNESS
[Patient reported PAP Smear was normal] : Patient reported PAP Smear was normal [N] : Patient does not use contraception [Monogamous (Male Partner)] : is monogamous with a male partner [Y] : Positive pregnancy history [TextBox_4] : 40 yo here for well woman exam. Monthly menses, lasting 3 days, very light. c/o worsening night sweats, also hair falling out. Also has been under stress recently due to her mother being sick. c/o dull cramping throughout month for last 2 months. c/o occasoinal incomplete emptying mostly in morning.  [PapSmeardate] : 2020 [LMPDate] : 6/4/21 [PGxTotal] : 1 [PGHxABInduced] : 1 [FreeTextEntry1] : ETOPx1. Denies cysts, +h/o fibroids, Hx LGSIL 2014 resolved, hx condyloma

## 2021-12-09 NOTE — DISCUSSION/SUMMARY
[FreeTextEntry1] : 42 yo here for well woman exam. \par 1) Health maintenance: \par Pap + HPV\par GC/CT\par STI testing, lab referral given\par Mammogram referral given\par \par 2) Light menses, night sweats: \par Bloodwork ordered to evaluate, to do on day 3\par WIll contact with results

## 2021-12-11 LAB
C TRACH RRNA SPEC QL NAA+PROBE: NOT DETECTED
HPV HIGH+LOW RISK DNA PNL CVX: NOT DETECTED
N GONORRHOEA RRNA SPEC QL NAA+PROBE: NOT DETECTED
SOURCE TP AMPLIFICATION: NORMAL

## 2021-12-15 LAB — CYTOLOGY CVX/VAG DOC THIN PREP: NORMAL

## 2021-12-22 ENCOUNTER — NON-APPOINTMENT (OUTPATIENT)
Age: 41
End: 2021-12-22

## 2022-01-05 DIAGNOSIS — N94.6 DYSMENORRHEA, UNSPECIFIED: ICD-10-CM

## 2022-01-06 ENCOUNTER — NON-APPOINTMENT (OUTPATIENT)
Age: 42
End: 2022-01-06

## 2022-03-16 ENCOUNTER — NON-APPOINTMENT (OUTPATIENT)
Age: 42
End: 2022-03-16

## 2022-05-25 NOTE — PHYSICAL EXAM
Problem: OCCUPATIONAL THERAPY ADULT  Goal: Performs self-care activities at highest level of function for planned discharge setting  See evaluation for individualized goals  Description: Treatment Interventions: ADL retraining, Functional transfer training, UE strengthening/ROM, Activityengagement, Energy conservation          See flowsheet documentation for full assessment, interventions and recommendations  5/25/2022 1437 by Janann Galeazzi, OTA  Outcome: Progressing  Note: Limitation: Decreased ADL status, Decreased UE strength, Decreased Safe judgement during ADL, Decreased cognition, Decreased endurance, Decreased self-care trans, Decreased high-level ADLs  Prognosis: Good  Assessment: Patient participated in Skilled OT session this date with interventions consisting of Energy Conservation techniques, safety awareness and fall prevention techniques, therapeutic exercise to: increase functional use of BUEs, increase BUE muscle strength ,  therapeutic activities to: increase activity tolerance and increase OOB/ sitting tolerance   Patient agreeable to OT treatment session, upon arrival patient was found seated OOB to Recliner  Patient requiring verbal cues for correct technique, verbal cues for pacing thru activity steps, one step directives, frequent rest periods and ocassional safety reminders and self-care assistance as noted in AM-PAC/flow sheet  Patient continues to be functioning below baseline level, occupational performance remains limited secondary to factors listed above and increased risk for falls and injury  From OT standpoint, recommendation at time of d/c would be post-acute rehabilitation services  Patient to benefit from continued Occupational Therapy treatment while in the hospital to address deficits as defined above and maximize level of functional independence with ADLs and functional mobility       OT Discharge Recommendation: Post acute rehabilitation services  OT - OK to Discharge: Yes (Once medically cleared)     Rocío Imus, HOLT/L [Appropriately responsive] : appropriately responsive [Alert] : alert [No Acute Distress] : no acute distress [No Lymphadenopathy] : no lymphadenopathy [Soft] : soft [Non-tender] : non-tender [Non-distended] : non-distended [No HSM] : No HSM [No Lesions] : no lesions [No Mass] : no mass [Oriented x3] : oriented x3 [Labia Majora] : normal [Labia Minora] : normal [Normal] : normal [Uterine Adnexae] : normal [No Tenderness] : no tenderness [Nl Sphincter Tone] : normal sphincter tone [External Hemorrhoid] : external hemorrhoid [FreeTextEntry4] : no prolapse noted [FreeTextEntry6] : 16 week globular uterus with palpable large posterior fibroid encompassing cul de sac and pushing anteriorly [FreeTextEntry9] : small external hemorrhoids noted

## 2022-06-06 ENCOUNTER — APPOINTMENT (OUTPATIENT)
Dept: OBGYN | Facility: CLINIC | Age: 42
End: 2022-06-06
Payer: MEDICAID

## 2022-06-06 VITALS
SYSTOLIC BLOOD PRESSURE: 96 MMHG | BODY MASS INDEX: 19.31 KG/M2 | DIASTOLIC BLOOD PRESSURE: 60 MMHG | RESPIRATION RATE: 16 BRPM | WEIGHT: 109 LBS | HEIGHT: 63 IN

## 2022-06-06 DIAGNOSIS — N90.89 OTHER SPECIFIED NONINFLAMMATORY DISORDERS OF VULVA AND PERINEUM: ICD-10-CM

## 2022-06-06 DIAGNOSIS — R10.2 PELVIC AND PERINEAL PAIN: ICD-10-CM

## 2022-06-06 DIAGNOSIS — N94.9 UNSPECIFIED CONDITION ASSOCIATED WITH FEMALE GENITAL ORGANS AND MENSTRUAL CYCLE: ICD-10-CM

## 2022-06-06 LAB
BILIRUB UR QL STRIP: NORMAL
CLARITY UR: CLEAR
COLLECTION METHOD: NORMAL
GLUCOSE UR-MCNC: NORMAL
HCG UR QL: 0.2 EU/DL
HCG UR QL: NEGATIVE
HGB UR QL STRIP.AUTO: ABNORMAL
KETONES UR-MCNC: NORMAL
LEUKOCYTE ESTERASE UR QL STRIP: NORMAL
NITRITE UR QL STRIP: NORMAL
PH UR STRIP: 6
PROT UR STRIP-MCNC: NORMAL
QUALITY CONTROL: YES
SP GR UR STRIP: 1.02

## 2022-06-06 PROCEDURE — 81025 URINE PREGNANCY TEST: CPT

## 2022-06-06 PROCEDURE — 99214 OFFICE O/P EST MOD 30 MIN: CPT

## 2022-06-06 PROCEDURE — 81003 URINALYSIS AUTO W/O SCOPE: CPT | Mod: QW

## 2022-06-06 NOTE — PHYSICAL EXAM
[Chaperone Present] : A chaperone was present in the examining room during all aspects of the physical examination [Appropriately responsive] : appropriately responsive [Alert] : alert [No Acute Distress] : no acute distress [No Lymphadenopathy] : no lymphadenopathy [Soft] : soft [Non-distended] : non-distended [No HSM] : No HSM [No Lesions] : no lesions [No Mass] : no mass [Oriented x3] : oriented x3 [FreeTextEntry7] : incision well-healed [Labia Majora] : normal on the left [Labia Minora] : normal [Normal] : normal [Tenderness] : nontender [Enlarged ___ wks] : not enlarged [Uterine Adnexae] : normal [External Hemorrhoid] : external hemorrhoid [FreeTextEntry1] : 1 [FreeTextEntry2] : 1cm right upper labial fissure

## 2022-06-06 NOTE — DISCUSSION/SUMMARY
[FreeTextEntry1] : 40 yo with right labial fissure, intermittent pelvic pain with hx of fibroids. Recent stressors, hx anxiety/depression. \par -Udip mod blood likely from recent menses: UA/UCx to r/o UTI\par -Affirm sent, will contact with results\par -Labial fissure: apply aquaphor TID to area, vulvar measures reviewed to expedite healing\par -Pelvic ultrasound given pelvic pain\par -Mental health referral\par -Return after ultrasound for follow-up/results

## 2022-06-06 NOTE — HISTORY OF PRESENT ILLNESS
[FreeTextEntry1] : Pt here c/o vaginal white area and burning during wiping x last few weeks. LMP 6/2 x 2-3 days, light. Denies discharge. Denies vaginal itching. Not sexually active for last 2 months, recently broke up with boyfriend. Her mother passed away in February and she is grieving and stressed. Her father is in and out of hospital due to his medical conditions. She sees a therapist but is not happy with her. Denies SI/HI. c/o intermittent cramping/pelvic pain, spontaneously resolved. Denies GI symptoms. Denies dysuria, hematuria, frequency. \par \par

## 2022-06-07 LAB
APPEARANCE: CLEAR
BACTERIA: NEGATIVE
BILIRUBIN URINE: NEGATIVE
BLOOD URINE: ABNORMAL
COLOR: YELLOW
GLUCOSE QUALITATIVE U: NEGATIVE
HYALINE CASTS: 1 /LPF
KETONES URINE: NEGATIVE
LEUKOCYTE ESTERASE URINE: NEGATIVE
MICROSCOPIC-UA: NORMAL
NITRITE URINE: NEGATIVE
PH URINE: 6
PROTEIN URINE: NORMAL
RED BLOOD CELLS URINE: 4 /HPF
SPECIFIC GRAVITY URINE: 1.03
SQUAMOUS EPITHELIAL CELLS: 1 /HPF
UROBILINOGEN URINE: NORMAL
WHITE BLOOD CELLS URINE: 0 /HPF

## 2022-06-08 LAB — BACTERIA UR CULT: NORMAL

## 2022-07-12 LAB
CANDIDA VAG CYTO: NOT DETECTED
G VAGINALIS+PREV SP MTYP VAG QL MICRO: DETECTED
T VAGINALIS VAG QL WET PREP: NOT DETECTED

## 2022-09-01 ENCOUNTER — APPOINTMENT (OUTPATIENT)
Dept: FAMILY MEDICINE | Facility: CLINIC | Age: 42
End: 2022-09-01

## 2022-09-01 VITALS
WEIGHT: 112 LBS | HEART RATE: 98 BPM | DIASTOLIC BLOOD PRESSURE: 70 MMHG | RESPIRATION RATE: 14 BRPM | OXYGEN SATURATION: 98 % | HEIGHT: 63 IN | BODY MASS INDEX: 19.84 KG/M2 | SYSTOLIC BLOOD PRESSURE: 110 MMHG

## 2022-09-01 VITALS — TEMPERATURE: 98 F

## 2022-09-01 DIAGNOSIS — Z13.29 ENCOUNTER FOR SCREENING FOR OTHER SUSPECTED ENDOCRINE DISORDER: ICD-10-CM

## 2022-09-01 DIAGNOSIS — Z13.0 ENCOUNTER FOR SCREENING FOR DISEASES OF THE BLOOD AND BLOOD-FORMING ORGANS AND CERTAIN DISORDERS INVOLVING THE IMMUNE MECHANISM: ICD-10-CM

## 2022-09-01 DIAGNOSIS — R19.6 HALITOSIS: ICD-10-CM

## 2022-09-01 DIAGNOSIS — H04.123 DRY MOUTH, UNSPECIFIED: ICD-10-CM

## 2022-09-01 DIAGNOSIS — R68.2 DRY MOUTH, UNSPECIFIED: ICD-10-CM

## 2022-09-01 DIAGNOSIS — H53.8 OTHER VISUAL DISTURBANCES: ICD-10-CM

## 2022-09-01 PROCEDURE — 99214 OFFICE O/P EST MOD 30 MIN: CPT | Mod: 25

## 2022-09-01 RX ORDER — METRONIDAZOLE 500 MG/1
500 TABLET ORAL TWICE DAILY
Qty: 14 | Refills: 0 | Status: DISCONTINUED | COMMUNITY
Start: 2022-06-08 | End: 2022-09-01

## 2022-09-01 RX ORDER — SULFAMETHOXAZOLE AND TRIMETHOPRIM 800; 160 MG/1; MG/1
800-160 TABLET ORAL
Qty: 6 | Refills: 0 | Status: DISCONTINUED | COMMUNITY
Start: 2022-03-16 | End: 2022-09-01

## 2022-09-01 RX ORDER — BUSPIRONE HYDROCHLORIDE 10 MG/1
10 TABLET ORAL TWICE DAILY
Qty: 60 | Refills: 0 | Status: DISCONTINUED | COMMUNITY
Start: 2021-12-06 | End: 2022-09-01

## 2022-09-01 RX ORDER — AMOXICILLIN 875 MG/1
875 TABLET, FILM COATED ORAL
Qty: 20 | Refills: 0 | Status: DISCONTINUED | COMMUNITY
Start: 2021-09-24 | End: 2022-09-01

## 2022-09-01 NOTE — PLAN
[FreeTextEntry1] : Extensive discussion \par start Allegra for relief of PND \par start artificial salvia \par start Cepachol \par start Listerine mouth wash \par establish care with ENT if not improved \par routine labs per patient request \par \par possible dry mouth side effect of long term Effexor \par continue all medications as directed \par RTO as routine for follow up. \par

## 2022-09-01 NOTE — REVIEW OF SYSTEMS
[Discharge] : no discharge [Pain] : no pain [Redness] : no redness [Dryness] : no dryness  [Vision Problems] : vision problems [Itching] : no itching [Postnasal Drip] : postnasal drip [Negative] : Heme/Lymph [FreeTextEntry4] : bad breath, post nasal drip

## 2022-09-01 NOTE — PHYSICAL EXAM
[No Acute Distress] : no acute distress [Normal Sclera/Conjunctiva] : normal sclera/conjunctiva [PERRL] : pupils equal round and reactive to light [EOMI] : extraocular movements intact [Normal Outer Ear/Nose] : the outer ears and nose were normal in appearance [Normal TMs] : both tympanic membranes were normal [No JVD] : no jugular venous distention [No Lymphadenopathy] : no lymphadenopathy [Supple] : supple [Thyroid Normal, No Nodules] : the thyroid was normal and there were no nodules present [No Respiratory Distress] : no respiratory distress  [No Accessory Muscle Use] : no accessory muscle use [Clear to Auscultation] : lungs were clear to auscultation bilaterally [Normal Rate] : normal rate  [Regular Rhythm] : with a regular rhythm [Normal S1, S2] : normal S1 and S2 [No Murmur] : no murmur heard [No Extremity Clubbing/Cyanosis] : no extremity clubbing/cyanosis [Normal Gait] : normal gait [Alert and Oriented x3] : oriented to person, place, and time [de-identified] : thick white coating on tounge removed with depressor, normal teeth, no erythema off palate

## 2022-09-01 NOTE — HISTORY OF PRESENT ILLNESS
[FreeTextEntry8] : 41 yr old female is here due PND, bad breath and dry mouth. Marline LICONA July treated for bad breathe, dry mouth and PND started on Augmentin. Since then she has PND everyday. She has taken pantoprazole per GI. She has not been to the dentist in awhile. Denies smoking, excessive alcohol use, sinus pressure, cough ear and eye pain.

## 2022-10-28 ENCOUNTER — NON-APPOINTMENT (OUTPATIENT)
Age: 42
End: 2022-10-28

## 2022-10-28 ENCOUNTER — APPOINTMENT (OUTPATIENT)
Dept: FAMILY MEDICINE | Facility: CLINIC | Age: 42
End: 2022-10-28

## 2022-10-28 VITALS
SYSTOLIC BLOOD PRESSURE: 110 MMHG | DIASTOLIC BLOOD PRESSURE: 74 MMHG | HEART RATE: 107 BPM | HEIGHT: 63 IN | RESPIRATION RATE: 14 BRPM | WEIGHT: 111 LBS | OXYGEN SATURATION: 98 % | BODY MASS INDEX: 19.67 KG/M2 | TEMPERATURE: 98.8 F

## 2022-10-28 VITALS — HEART RATE: 92 BPM

## 2022-10-28 DIAGNOSIS — J02.9 ACUTE PHARYNGITIS, UNSPECIFIED: ICD-10-CM

## 2022-10-28 DIAGNOSIS — Z12.39 ENCOUNTER FOR OTHER SCREENING FOR MALIGNANT NEOPLASM OF BREAST: ICD-10-CM

## 2022-10-28 DIAGNOSIS — R06.00 DYSPNEA, UNSPECIFIED: ICD-10-CM

## 2022-10-28 DIAGNOSIS — R00.0 TACHYCARDIA, UNSPECIFIED: ICD-10-CM

## 2022-10-28 DIAGNOSIS — R92.2 INCONCLUSIVE MAMMOGRAM: ICD-10-CM

## 2022-10-28 LAB — S PYO AG SPEC QL IA: NEGATIVE

## 2022-10-28 PROCEDURE — 99214 OFFICE O/P EST MOD 30 MIN: CPT | Mod: 25

## 2022-10-28 PROCEDURE — 87880 STREP A ASSAY W/OPTIC: CPT | Mod: QW

## 2022-10-28 RX ORDER — METRONIDAZOLE 7.5 MG/G
0.75 GEL VAGINAL
Qty: 70 | Refills: 0 | Status: DISCONTINUED | COMMUNITY
Start: 2022-07-26

## 2022-10-28 RX ORDER — IMIQUIMOD 50 MG/G
5 CREAM TOPICAL
Qty: 12 | Refills: 0 | Status: DISCONTINUED | COMMUNITY
Start: 2022-08-18

## 2022-10-28 RX ORDER — FAMOTIDINE 20 MG/1
20 TABLET, FILM COATED ORAL
Qty: 30 | Refills: 0 | Status: ACTIVE | COMMUNITY
Start: 2022-05-11

## 2022-10-28 RX ORDER — ALBUTEROL SULFATE 90 UG/1
108 (90 BASE) INHALANT RESPIRATORY (INHALATION)
Qty: 1 | Refills: 2 | Status: ACTIVE | COMMUNITY
Start: 2022-10-28 | End: 1900-01-01

## 2022-10-28 NOTE — HISTORY OF PRESENT ILLNESS
[FreeTextEntry8] : Patient presents with sore throat, shortness of breath, cough, she feels weak and her chest feels achy. She lost her voice a few days ago. She had the chills Sunday/Monday. Also states she feels nauseous\par  She went to [facility]  on Monday, got tested for Covid (rapid)  ,strep, and FLU. It all came back negative. No fever. \par \par she had a covid, strep and flu test at [facility] urgent care monday\par + fevers,chills, body aches, cough, nasal congestion, sore throat, chest discomfort , +weakness , +lost voice \par +brown nasal discharge \par denies sob,  diarrhea, loss of taste or smell,\par she has pain w/ swallowing\par her sore throat improved and then came back again\par her heart rate has been high\par \par c/o ear discomfort\par

## 2022-10-28 NOTE — REVIEW OF SYSTEMS
[Fever] : fever [Chills] : chills [Nasal Discharge] : nasal discharge [Sore Throat] : sore throat [Cough] : cough [Chest Pain] : no chest pain [FreeTextEntry5] : +chest discomfort at times  [FreeTextEntry6] : +sob at times

## 2022-10-28 NOTE — PHYSICAL EXAM
[Normal TMs] : both tympanic membranes were normal [No Lymphadenopathy] : no lymphadenopathy [Supple] : supple [No Edema] : there was no peripheral edema [Normal Appearance] : normal in appearance [No Nipple Discharge] : no nipple discharge [No Axillary Lymphadenopathy] : no axillary lymphadenopathy [Normal] : soft, non-tender, non-distended, no masses palpated, no HSM and normal bowel sounds [No Focal Deficits] : no focal deficits [Normal Affect] : the affect was normal [Normal Mood] : the mood was normal [Normal Insight/Judgement] : insight and judgment were intact [de-identified] : mild mucus in back of throat , no erythema of throat noted [de-identified] : + Anterior cervical lymphadenopathy noted bilaterally [de-identified] : No pain on palpation of the chest [de-identified] : no calf tenderness b/l LE [de-identified] : dense breasts right 4oclock and left 12 and 7oclock\par dense breasts right 4oclock and left 12 and 7oclock [de-identified] : No guarding or rigidity, negative Otto sign

## 2022-10-28 NOTE — PLAN
[FreeTextEntry1] : \par suspect covid19 vs viral vs strep vs respiratory infection \par -Rapid strep neg\par -Throat cx sent\par -Hot tea w/ honey, warm salt water gargles, increase fluids\par tylenol  prn  no more than 4000mg po daily \par tessalon perles 1-2 tabs po tid prn\par albuterol q4-6hrs prn \par flonase/azelastine \par -EKG- NSR @   91 BPM, NORMAL AXIS, NORMAL PA/QT INTERVAL, NO ST/ T WAVE ABNORMALITIES NOTED\par EKG reviewed\par advised to hydrate\par f/u cardiologist if chest discomfort doesn’t improve after illness improves\par cxr-advised to perform today\par Start azithromycin 250 mg tab 2 tabs today, and then 1 tab p.o. daily for 4 more days\par \par dense breasts right 4oclock and left 12 and 7oclock\par us breasts\par mammogram\par \par + Anterior cervical lymphadenopathy noted bilaterally\par If does not resolve after illness resolves will need an ultrasound\par \par if Chest pain or trouble breathing advised to go to the emergency room or call 911\par Follow-up in 2 weeks or sooner if issues arise patient agreed with plan

## 2022-10-31 ENCOUNTER — NON-APPOINTMENT (OUTPATIENT)
Age: 42
End: 2022-10-31

## 2022-10-31 DIAGNOSIS — N76.0 ACUTE VAGINITIS: ICD-10-CM

## 2022-10-31 DIAGNOSIS — B96.89 ACUTE VAGINITIS: ICD-10-CM

## 2022-10-31 LAB
B PERT DNA SPEC QL NAA+PROBE: NOT DETECTED
BACTERIA THROAT CULT: NORMAL
C PNEUM DNA SPEC QL NAA+PROBE: NOT DETECTED
CORONAVIRUS (229E,HKU1,NL63,OC43): NOT DETECTED
FLUAV H1 2009 PAND RNA SPEC QL NAA+PROBE: NOT DETECTED
FLUAV H1 RNA SPEC QL NAA+PROBE: NOT DETECTED
FLUAV H3 RNA SPEC QL NAA+PROBE: NOT DETECTED
FLUAV SUBTYP SPEC NAA+PROBE: NOT DETECTED
FLUBV RNA SPEC QL NAA+PROBE: NOT DETECTED
HADV DNA SPEC QL NAA+PROBE: NOT DETECTED
HMPV RNA SPEC QL NAA+PROBE: NOT DETECTED
HPIV1 RNA SPEC QL NAA+PROBE: NOT DETECTED
HPIV2 RNA SPEC QL NAA+PROBE: NOT DETECTED
HPIV3 RNA SPEC QL NAA+PROBE: NOT DETECTED
HPIV4 RNA SPEC QL NAA+PROBE: NOT DETECTED
RAPID RVP RESULT: NOT DETECTED
RSV RNA SPEC QL NAA+PROBE: NOT DETECTED
RV+EV RNA SPEC QL NAA+PROBE: NOT DETECTED
SARS-COV-2 RNA PNL RESP NAA+PROBE: NOT DETECTED

## 2023-03-23 ENCOUNTER — APPOINTMENT (OUTPATIENT)
Dept: FAMILY MEDICINE | Facility: CLINIC | Age: 43
End: 2023-03-23

## 2023-03-27 ENCOUNTER — APPOINTMENT (OUTPATIENT)
Dept: FAMILY MEDICINE | Facility: CLINIC | Age: 43
End: 2023-03-27
Payer: MEDICAID

## 2023-03-27 VITALS
OXYGEN SATURATION: 98 % | WEIGHT: 111 LBS | HEART RATE: 71 BPM | SYSTOLIC BLOOD PRESSURE: 98 MMHG | DIASTOLIC BLOOD PRESSURE: 66 MMHG | HEIGHT: 63 IN | RESPIRATION RATE: 14 BRPM | BODY MASS INDEX: 19.67 KG/M2

## 2023-03-27 VITALS — TEMPERATURE: 97.4 F

## 2023-03-27 DIAGNOSIS — Z13.220 ENCOUNTER FOR SCREENING FOR LIPOID DISORDERS: ICD-10-CM

## 2023-03-27 DIAGNOSIS — R20.0 ANESTHESIA OF SKIN: ICD-10-CM

## 2023-03-27 DIAGNOSIS — M79.89 OTHER SPECIFIED SOFT TISSUE DISORDERS: ICD-10-CM

## 2023-03-27 DIAGNOSIS — Z13.1 ENCOUNTER FOR SCREENING FOR DIABETES MELLITUS: ICD-10-CM

## 2023-03-27 PROCEDURE — 99214 OFFICE O/P EST MOD 30 MIN: CPT

## 2023-03-27 RX ORDER — AZITHROMYCIN 250 MG/1
250 TABLET, FILM COATED ORAL
Qty: 1 | Refills: 0 | Status: DISCONTINUED | COMMUNITY
Start: 2022-10-28 | End: 2023-03-27

## 2023-03-27 RX ORDER — FLUCONAZOLE 150 MG/1
150 TABLET ORAL
Qty: 2 | Refills: 0 | Status: DISCONTINUED | COMMUNITY
Start: 2022-08-04 | End: 2023-03-27

## 2023-03-27 RX ORDER — BENZONATATE 100 MG/1
100 CAPSULE ORAL
Qty: 42 | Refills: 0 | Status: DISCONTINUED | COMMUNITY
Start: 2022-10-28 | End: 2023-03-27

## 2023-03-27 RX ORDER — ONDANSETRON 4 MG/1
4 TABLET, ORALLY DISINTEGRATING ORAL
Qty: 6 | Refills: 0 | Status: ACTIVE | COMMUNITY
Start: 2023-02-21

## 2023-03-27 NOTE — REVIEW OF SYSTEMS
[Fatigue] : fatigue [Suicidal] : not suicidal [Anxiety] : anxiety [Depression] : depression [Negative] : Heme/Lymph [FreeTextEntry3] : possible swelling above left eye [FreeTextEntry5] : swelling of hands, no swelling in feet [de-identified] : numbness in hands and on left side of body

## 2023-03-27 NOTE — PLAN
[FreeTextEntry1] : she was advised to consider decreasing the dose of gabapentin to 300 mg bid, consider neurology evaluation, labs were ordered to further assess her symptoms, see orders for details, low salt diet was advised, the PHQ 9 result was reviewed and she was advised to follow up with her psychiatrist

## 2023-03-27 NOTE — PHYSICAL EXAM
[No Acute Distress] : no acute distress [Well Nourished] : well nourished [Well Developed] : well developed [Well-Appearing] : well-appearing [Normal Voice/Communication] : normal voice/communication [Normal Sclera/Conjunctiva] : normal sclera/conjunctiva [No Respiratory Distress] : no respiratory distress  [No Accessory Muscle Use] : no accessory muscle use [Clear to Auscultation] : lungs were clear to auscultation bilaterally [Normal Rate] : normal rate  [Regular Rhythm] : with a regular rhythm [Normal S1, S2] : normal S1 and S2 [No Murmur] : no murmur heard [No Edema] : there was no peripheral edema [Coordination Grossly Intact] : coordination grossly intact [de-identified] : 3 plus radial pulses bilaterally [de-identified] : mildly anxious

## 2023-03-27 NOTE — HISTORY OF PRESENT ILLNESS
[FreeTextEntry8] : patient presents with b/l hand swelling, numbness and tingling for the past week, admits Gabapentin was increased.  The gabapentin dose was increased to 300 mg tid by her psychiatrist due to anxiety 2 weeks ago.  Then she noticed her hands felt heavy and looked a little swollen.  Her fingers were feeling stiff and yesterday she felt blood rushing into the right arm and sometimes her hands go numb when sleeping.  Her eyesight is getting worse and the constipation has suddenly worsened and she has been urinating a little more.  She does eat on the run and doesn't always follow a healthy diet

## 2023-04-02 ENCOUNTER — NON-APPOINTMENT (OUTPATIENT)
Age: 43
End: 2023-04-02

## 2023-04-11 ENCOUNTER — APPOINTMENT (OUTPATIENT)
Dept: UROLOGY | Facility: CLINIC | Age: 43
End: 2023-04-11

## 2023-05-15 ENCOUNTER — NON-APPOINTMENT (OUTPATIENT)
Age: 43
End: 2023-05-15

## 2023-05-17 ENCOUNTER — NON-APPOINTMENT (OUTPATIENT)
Age: 43
End: 2023-05-17

## 2023-06-11 ENCOUNTER — NON-APPOINTMENT (OUTPATIENT)
Age: 43
End: 2023-06-11

## 2023-08-09 ENCOUNTER — APPOINTMENT (OUTPATIENT)
Dept: FAMILY MEDICINE | Facility: CLINIC | Age: 43
End: 2023-08-09
Payer: MEDICAID

## 2023-08-09 VITALS
RESPIRATION RATE: 14 BRPM | DIASTOLIC BLOOD PRESSURE: 62 MMHG | BODY MASS INDEX: 20.38 KG/M2 | HEIGHT: 63 IN | HEART RATE: 99 BPM | OXYGEN SATURATION: 99 % | SYSTOLIC BLOOD PRESSURE: 90 MMHG | WEIGHT: 115 LBS

## 2023-08-09 DIAGNOSIS — G47.00 INSOMNIA, UNSPECIFIED: ICD-10-CM

## 2023-08-09 DIAGNOSIS — F43.21 ADJUSTMENT DISORDER WITH DEPRESSED MOOD: ICD-10-CM

## 2023-08-09 PROCEDURE — 99214 OFFICE O/P EST MOD 30 MIN: CPT

## 2023-08-09 NOTE — ASSESSMENT
[FreeTextEntry1] : KASSANDRA MIRANDA is a 42 year female who presents today Aug 09, 2023 with worsening anxiety, depression, and trouble sleeping x a few weeks.   Anxiety/Depression  - continue currently medications - Some ways to manage anxiety disorders include learning about anxiety, mindfulness, relaxation techniques, correct breathing techniques, dietary adjustments, exercise, learning to be assertive, building self-esteem, cognitive therapy, exposure therapy, structured problem solving, medication and support groups   Insomnia - One of the best ways to train your body to sleep well is to go to bed and get up at more or less the same time every day, even on weekends and days off. Only try to sleep when you actually feel tired or sleepy, rather than spending too much time awake in bed. If you havent been able to get to sleep after about 20 minutes or more, get up and do something calming or boring until you feel sleepy, then return to bed and try again.It is best to avoid consuming any caffeine (in coffee, tea, cola drinks, chocolate, and some medications) or nicotine (cigarettes) for at least 4-6 hours before going to bed.It is also best to avoid alcohol for at least 4-6 hours before going to bed. Try not to use your bed for anything other than sleeping. If you use bed as a place to watch TV, eat, read, work on your laptop, pay bills, and other things, your body will not learn this connection.  - Try OTC conservative treatment like melatonin, CBD, valerian root, sleepytime tea.   Disability - Psych NP is currently working on disability forms. If patient does not receive them in timely fashion, then we can do disability forms for her.   Continue follow up with current psych NP and therapist. Will put in referral for behavioral health for new psych NP and therapist for talk therapy.   lab script given to patient for routine blood work to be done at outside lab.  Follow up with PCP in 4 weeks.

## 2023-08-09 NOTE — HISTORY OF PRESENT ILLNESS
[FreeTextEntry8] : KASSANDRA MIRANDA is a 42 year female who presents today Aug 09, 2023 with increased with depression and anxiety and would like to take a temporary leave from work. c/o insomnia worsening and feeling off and feels like medication is not working properly. She is on Venlafaxine and clonazepam. 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 She goes to psych NP for medications and talks to a therapist for 30 mins. She gave disability paperwork to the psych NP, but has been over 1 week waiting for them to be completed and submitted. She would like for us to fill out disability paperwork.   She is here for blood work to make sure nothing else medically is wrong to explain her symptoms.

## 2023-08-14 ENCOUNTER — TRANSCRIPTION ENCOUNTER (OUTPATIENT)
Age: 43
End: 2023-08-14

## 2023-08-22 ENCOUNTER — APPOINTMENT (OUTPATIENT)
Dept: FAMILY MEDICINE | Facility: CLINIC | Age: 43
End: 2023-08-22

## 2023-08-24 ENCOUNTER — APPOINTMENT (OUTPATIENT)
Dept: FAMILY MEDICINE | Facility: CLINIC | Age: 43
End: 2023-08-24
Payer: MEDICAID

## 2023-08-24 VITALS
DIASTOLIC BLOOD PRESSURE: 70 MMHG | HEART RATE: 81 BPM | TEMPERATURE: 97.9 F | OXYGEN SATURATION: 99 % | BODY MASS INDEX: 20.73 KG/M2 | RESPIRATION RATE: 14 BRPM | SYSTOLIC BLOOD PRESSURE: 110 MMHG | HEIGHT: 63 IN | WEIGHT: 117 LBS

## 2023-08-24 DIAGNOSIS — F32.A DEPRESSION, UNSPECIFIED: ICD-10-CM

## 2023-08-24 DIAGNOSIS — R39.15 URGENCY OF URINATION: ICD-10-CM

## 2023-08-24 DIAGNOSIS — L74.9 ECCRINE SWEAT DISORDER, UNSPECIFIED: ICD-10-CM

## 2023-08-24 DIAGNOSIS — R23.2 FLUSHING: ICD-10-CM

## 2023-08-24 DIAGNOSIS — F41.9 ANXIETY DISORDER, UNSPECIFIED: ICD-10-CM

## 2023-08-24 LAB
BILIRUB UR QL STRIP: NEGATIVE
GLUCOSE UR-MCNC: NEGATIVE
HCG UR QL: 0.2 EU/DL
HGB UR QL STRIP.AUTO: NORMAL
KETONES UR-MCNC: NEGATIVE
LEUKOCYTE ESTERASE UR QL STRIP: NEGATIVE
NITRITE UR QL STRIP: NEGATIVE
PH UR STRIP: 6
PROT UR STRIP-MCNC: NEGATIVE
SP GR UR STRIP: 1.01

## 2023-08-24 PROCEDURE — 81003 URINALYSIS AUTO W/O SCOPE: CPT | Mod: QW

## 2023-08-24 PROCEDURE — 99214 OFFICE O/P EST MOD 30 MIN: CPT | Mod: 25

## 2023-08-24 RX ORDER — GABAPENTIN 300 MG/1
300 CAPSULE ORAL 3 TIMES DAILY
Qty: 1 | Refills: 3 | Status: ACTIVE | COMMUNITY
Start: 2021-05-06

## 2023-08-24 RX ORDER — CLONAZEPAM 0.5 MG/1
0.5 TABLET ORAL 3 TIMES DAILY
Refills: 0 | Status: ACTIVE | COMMUNITY
Start: 2021-01-25

## 2023-08-26 PROBLEM — F41.9 ANXIETY: Status: ACTIVE | Noted: 2019-02-27

## 2023-08-26 PROBLEM — F32.A CHRONIC DEPRESSION: Status: ACTIVE | Noted: 2018-02-28

## 2023-08-26 NOTE — PLAN
Hpi Title: Evaluation of Skin Lesions How Severe Are Your Spot(S)?: mild Have Your Spot(S) Been Treated In The Past?: has not been treated [FreeTextEntry1] : anxiety/depression/ difficulty sleeping  [id number]=17 johnson=15 Denies suicidal or homicidal ideations going to see another psych provider, wants to switch wants to find out about medical marijuana for sleeping  behavioral consult placed, she agreed Will not change venlafaxine right now as patient had side effects on higher doses continue venlafaxine ER 75mg po daily on gabapentin 300mg po tid on clonazepam 0.5mg tid prn   flushing, sweating chronically for years check catecholamines, metanephrines, pth, aldosterone, renin, act, cortisol   nocturia   advised to drink less water before bed  urologist consult urine dip-  +small blood no leuks or nitrites  f/u 2 weeks or sooner if issues arise pt agreed w/plan

## 2023-08-26 NOTE — HEALTH RISK ASSESSMENT
[1] : 1) Little interest or pleasure doing things for several days (1) [3] : 2) Feeling down, depressed, or hopeless for nearly every day (3) [PHQ-2 Positive] : PHQ-2 Positive [Several Days (1)] : 5.) Poor appetite or overeating? Several days [Nearly Every Day (3)] : 7.) Trouble concentrating on things, such as reading a newspaper or watching television? Nearly every day [Not at All (0)] : 8.) Moving or speaking so slowly that other people could have noticed, or the opposite, moving or speaking faster than usual? Not at all [Moderate] : severity of depression is moderate [Extremely Difficult] : How difficult have these problems made it for you to do your work, take care of things at home, or get along with people? Extremely difficult [PHQ-9 Positive] : PHQ-9 Positive [[id number]] : 4 [[id number]] : 17

## 2023-08-26 NOTE — PHYSICAL EXAM
[No Lymphadenopathy] : no lymphadenopathy [Supple] : supple [Normal] : normal rate, regular rhythm, normal S1 and S2 and no murmur heard [No Edema] : there was no peripheral edema [No Focal Deficits] : no focal deficits [Normal Affect] : the affect was normal [Normal Mood] : the mood was normal [Normal Insight/Judgement] : insight and judgment were intact [de-identified] : no calf tenderness b/l LE [de-identified] : +seems anxious

## 2023-08-26 NOTE — HISTORY OF PRESENT ILLNESS
[FreeTextEntry8] : patient c/o severe anxiety and not being able to sleep well X 1 month. patient presents to review lab results.  she is on a leave at work bc she cannot sleep and she is having anxiety and depression the "worst she ever had."  she said her father wont let her put the AC on and she has difficulty sleeping due to this  Denies suicidal or homicidal ideations she is urinating at night , she is not drinking a lot of water before bed she wants to see a urologist she said for years she has had flushing of face and severe sweating and it has caused her a lot of anxiety over the years  when she tried to increase venlafaxine she was constipated

## 2023-08-29 LAB
APPEARANCE: CLEAR
BACTERIA UR CULT: NORMAL
BILIRUBIN URINE: NEGATIVE
BLOOD URINE: NEGATIVE
COLOR: YELLOW
GLUCOSE QUALITATIVE U: NEGATIVE MG/DL
KETONES URINE: NEGATIVE MG/DL
LEUKOCYTE ESTERASE URINE: NEGATIVE
NITRITE URINE: NEGATIVE
PH URINE: 6
PROTEIN URINE: NEGATIVE MG/DL
SPECIFIC GRAVITY URINE: 1.01
UROBILINOGEN URINE: 0.2 MG/DL

## 2023-12-01 ENCOUNTER — APPOINTMENT (OUTPATIENT)
Dept: FAMILY MEDICINE | Facility: CLINIC | Age: 43
End: 2023-12-01

## 2023-12-04 DIAGNOSIS — K21.9 GASTRO-ESOPHAGEAL REFLUX DISEASE W/OUT ESOPHAGITIS: ICD-10-CM

## 2023-12-04 RX ORDER — PANTOPRAZOLE 40 MG/1
40 TABLET, DELAYED RELEASE ORAL
Qty: 14 | Refills: 1 | Status: ACTIVE | COMMUNITY
Start: 2022-10-28 | End: 1900-01-01

## 2024-04-16 DIAGNOSIS — M79.10 MYALGIA, UNSPECIFIED SITE: ICD-10-CM

## 2024-04-17 ENCOUNTER — APPOINTMENT (OUTPATIENT)
Dept: FAMILY MEDICINE | Facility: CLINIC | Age: 44
End: 2024-04-17

## 2024-04-18 ENCOUNTER — APPOINTMENT (OUTPATIENT)
Dept: FAMILY MEDICINE | Facility: CLINIC | Age: 44
End: 2024-04-18
Payer: MEDICAID

## 2024-04-18 ENCOUNTER — RESULT CHARGE (OUTPATIENT)
Age: 44
End: 2024-04-18

## 2024-04-18 DIAGNOSIS — D72.819 DECREASED WHITE BLOOD CELL COUNT, UNSPECIFIED: ICD-10-CM

## 2024-04-18 DIAGNOSIS — R53.83 OTHER FATIGUE: ICD-10-CM

## 2024-04-18 PROCEDURE — 99214 OFFICE O/P EST MOD 30 MIN: CPT

## 2024-04-18 PROCEDURE — XXXXX: CPT

## 2024-04-18 RX ORDER — BUPROPION HYDROCHLORIDE 75 MG/1
75 TABLET, FILM COATED ORAL
Refills: 0 | Status: ACTIVE | COMMUNITY

## 2024-04-18 NOTE — HISTORY OF PRESENT ILLNESS
[FreeTextEntry8] : Patient initiated communication for concern via HIPAA secure platform  (Telemedicine )  for     feeling ill and achy                 using telehealth              .Reviewed quarantine instructions and self isolation to limit spread of disease  as per [place] guidelines.  Patient is an established patient and has verbalized consent to be treated via telemedicine .she was feverish   3  weeks achy in her chest .  she went to hematology and  her wbc was low 3.0   - heme said she was fighting something. her b12 was low . she might need iron infusion. . it lasted a long time which was unusual.  she feels better . today her repeat wbc was 4.3   she has been fatigued for a while. it could be the b12, the iron. the virus she also has depression.  she feels generally unwell. Her  depression  is mostly associated with the stresse bc of her dad. he hurt his knee. she has to walk his dog several times per day. I dont eat right and i just eat whatever there is.  she is on disability . she has no money for food.  she was not feeling well for weeks and was feverish  but was only w fever one day. today she feeels well.   she is going through a lot of stress.   the oncology felt her lymph and said they were swollen.   she sees a psych and she started wellbutrin . she is able to grasp words now . she has been on effexor for 15 years.  she has been losing hair  for a while. will wellbutrin affect her hair. Is it bad to be on this many medications?

## 2024-04-18 NOTE — ASSESSMENT
[FreeTextEntry1] : 42 yo wf co aches feverish feeling and fatigue for 3 weeks. she was seen by hematology who did labs. she had  a low wbc and it was improved today when it was repeated . she is feeling better. she is also struggling with depression. Having a viral infection and depression compounded the way she felt. she has a lot of stress her dad is ill and she has to walk the dog several times a day and she is on disability and does not have an income to buy proper foods and her mom  of cancer during the pandemic. she is dealing with a lot.she wants to see what virus she had. Labs drawn/ specimens obtained  in office on this date of service  for evaluation of   assessed conditions -  swab    to be run at [facility].
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
## 2024-04-19 LAB
RAPID RVP RESULT: NOT DETECTED
S PYO DNA THROAT QL NAA+PROBE: NOT DETECTED
SARS-COV-2 RNA PNL RESP NAA+PROBE: NOT DETECTED

## 2024-04-21 PROBLEM — R53.83 FATIGUE, UNSPECIFIED TYPE: Status: ACTIVE | Noted: 2018-05-23

## 2024-06-28 ENCOUNTER — APPOINTMENT (OUTPATIENT)
Dept: FAMILY MEDICINE | Facility: CLINIC | Age: 44
End: 2024-06-28

## 2024-11-07 ENCOUNTER — NON-APPOINTMENT (OUTPATIENT)
Age: 44
End: 2024-11-07

## 2024-11-08 ENCOUNTER — APPOINTMENT (OUTPATIENT)
Dept: COLORECTAL SURGERY | Facility: CLINIC | Age: 44
End: 2024-11-08
Payer: MEDICAID

## 2024-11-08 VITALS
OXYGEN SATURATION: 100 % | HEIGHT: 63.5 IN | DIASTOLIC BLOOD PRESSURE: 75 MMHG | WEIGHT: 106 LBS | BODY MASS INDEX: 18.55 KG/M2 | RESPIRATION RATE: 16 BRPM | SYSTOLIC BLOOD PRESSURE: 119 MMHG | HEART RATE: 85 BPM

## 2024-11-08 DIAGNOSIS — R10.2 PELVIC AND PERINEAL PAIN: ICD-10-CM

## 2024-11-08 DIAGNOSIS — K64.9 UNSPECIFIED HEMORRHOIDS: ICD-10-CM

## 2024-11-08 DIAGNOSIS — K62.3 RECTAL PROLAPSE: ICD-10-CM

## 2024-11-08 DIAGNOSIS — N81.6 RECTOCELE: ICD-10-CM

## 2024-11-08 DIAGNOSIS — K59.00 CONSTIPATION, UNSPECIFIED: ICD-10-CM

## 2024-11-08 PROCEDURE — 46600 DIAGNOSTIC ANOSCOPY SPX: CPT

## 2024-11-08 PROCEDURE — 99204 OFFICE O/P NEW MOD 45 MIN: CPT | Mod: 25

## 2024-11-08 RX ORDER — HYDROCORTISONE 25 MG/G
2.5 CREAM TOPICAL
Qty: 1 | Refills: 1 | Status: ACTIVE | COMMUNITY
Start: 2024-11-08 | End: 1900-01-01

## 2024-11-09 PROBLEM — N81.6 RECTOCELE: Status: ACTIVE | Noted: 2024-11-09

## 2024-11-09 PROBLEM — K62.3 RECTAL PROLAPSE: Status: ACTIVE | Noted: 2024-11-09

## 2025-04-08 ENCOUNTER — APPOINTMENT (OUTPATIENT)
Dept: FAMILY MEDICINE | Facility: CLINIC | Age: 45
End: 2025-04-08
Payer: MEDICAID

## 2025-04-08 DIAGNOSIS — B96.89 ACUTE SINUSITIS, UNSPECIFIED: ICD-10-CM

## 2025-04-08 DIAGNOSIS — B96.89 ACUTE VAGINITIS: ICD-10-CM

## 2025-04-08 DIAGNOSIS — N76.0 ACUTE VAGINITIS: ICD-10-CM

## 2025-04-08 DIAGNOSIS — J01.90 ACUTE SINUSITIS, UNSPECIFIED: ICD-10-CM

## 2025-04-08 PROCEDURE — 99212 OFFICE O/P EST SF 10 MIN: CPT | Mod: 93

## 2025-04-08 RX ORDER — AMOXICILLIN AND CLAVULANATE POTASSIUM 875; 125 MG/1; MG/1
875-125 TABLET, COATED ORAL
Qty: 20 | Refills: 0 | Status: ACTIVE | COMMUNITY
Start: 2025-04-08 | End: 1900-01-01

## 2025-04-08 RX ORDER — AZELASTINE HYDROCHLORIDE 137 UG/1
0.1 SPRAY, METERED NASAL TWICE DAILY
Qty: 1 | Refills: 2 | Status: ACTIVE | COMMUNITY
Start: 2025-04-08 | End: 1900-01-01

## 2025-04-08 RX ORDER — METRONIDAZOLE 7.5 MG/G
0.75 GEL VAGINAL
Qty: 5 | Refills: 0 | Status: ACTIVE | COMMUNITY
Start: 2025-04-08 | End: 1900-01-01

## 2025-04-14 DIAGNOSIS — N76.0 ACUTE VAGINITIS: ICD-10-CM

## 2025-04-14 RX ORDER — FLUCONAZOLE 150 MG/1
150 TABLET ORAL
Qty: 1 | Refills: 0 | Status: ACTIVE | COMMUNITY
Start: 2025-04-14 | End: 1900-01-01

## 2025-04-28 ENCOUNTER — APPOINTMENT (OUTPATIENT)
Dept: FAMILY MEDICINE | Facility: CLINIC | Age: 45
End: 2025-04-28
Payer: MEDICAID

## 2025-04-28 DIAGNOSIS — B96.89 CHRONIC SINUSITIS, UNSPECIFIED: ICD-10-CM

## 2025-04-28 DIAGNOSIS — J32.9 CHRONIC SINUSITIS, UNSPECIFIED: ICD-10-CM

## 2025-04-28 PROCEDURE — 99213 OFFICE O/P EST LOW 20 MIN: CPT | Mod: 95

## 2025-04-28 PROCEDURE — G2211 COMPLEX E/M VISIT ADD ON: CPT | Mod: NC,95

## 2025-04-28 RX ORDER — AZITHROMYCIN 250 MG/1
250 TABLET, FILM COATED ORAL
Qty: 1 | Refills: 0 | Status: ACTIVE | COMMUNITY
Start: 2025-04-28 | End: 1900-01-01

## 2025-04-28 RX ORDER — NAPROXEN 500 MG/1
500 TABLET ORAL
Qty: 20 | Refills: 0 | Status: ACTIVE | COMMUNITY
Start: 2025-04-28 | End: 1900-01-01